# Patient Record
Sex: FEMALE | Race: WHITE | NOT HISPANIC OR LATINO | URBAN - METROPOLITAN AREA
[De-identification: names, ages, dates, MRNs, and addresses within clinical notes are randomized per-mention and may not be internally consistent; named-entity substitution may affect disease eponyms.]

---

## 2019-12-28 ENCOUNTER — EMERGENCY (EMERGENCY)
Facility: HOSPITAL | Age: 24
LOS: 0 days | Discharge: HOME | End: 2019-12-28
Attending: EMERGENCY MEDICINE
Payer: COMMERCIAL

## 2019-12-28 VITALS — OXYGEN SATURATION: 97 % | DIASTOLIC BLOOD PRESSURE: 84 MMHG | HEART RATE: 102 BPM | SYSTOLIC BLOOD PRESSURE: 134 MMHG

## 2019-12-28 VITALS — WEIGHT: 104.06 LBS

## 2019-12-28 DIAGNOSIS — R06.2 WHEEZING: ICD-10-CM

## 2019-12-28 DIAGNOSIS — R06.02 SHORTNESS OF BREATH: ICD-10-CM

## 2019-12-28 DIAGNOSIS — J45.901 UNSPECIFIED ASTHMA WITH (ACUTE) EXACERBATION: ICD-10-CM

## 2019-12-28 DIAGNOSIS — J06.9 ACUTE UPPER RESPIRATORY INFECTION, UNSPECIFIED: ICD-10-CM

## 2019-12-28 LAB
FLU A RESULT: SIGNIFICANT CHANGE UP
FLU A RESULT: SIGNIFICANT CHANGE UP
FLUAV AG NPH QL: SIGNIFICANT CHANGE UP
FLUBV AG NPH QL: SIGNIFICANT CHANGE UP
RSV RESULT: SIGNIFICANT CHANGE UP
RSV RNA RESP QL NAA+PROBE: SIGNIFICANT CHANGE UP

## 2019-12-28 PROCEDURE — 87631 RESP VIRUS 3-5 TARGETS: CPT

## 2019-12-28 PROCEDURE — 99284 EMERGENCY DEPT VISIT MOD MDM: CPT

## 2019-12-28 PROCEDURE — 71046 X-RAY EXAM CHEST 2 VIEWS: CPT | Mod: 26

## 2019-12-28 PROCEDURE — 71046 X-RAY EXAM CHEST 2 VIEWS: CPT

## 2019-12-28 PROCEDURE — 94640 AIRWAY INHALATION TREATMENT: CPT

## 2019-12-28 PROCEDURE — 99283 EMERGENCY DEPT VISIT LOW MDM: CPT | Mod: 25

## 2019-12-28 PROCEDURE — 81025 URINE PREGNANCY TEST: CPT

## 2019-12-28 RX ORDER — IPRATROPIUM/ALBUTEROL SULFATE 18-103MCG
3 AEROSOL WITH ADAPTER (GRAM) INHALATION ONCE
Refills: 0 | Status: COMPLETED | OUTPATIENT
Start: 2019-12-28 | End: 2019-12-28

## 2019-12-28 RX ADMIN — Medication 50 MILLIGRAM(S): at 04:33

## 2019-12-28 RX ADMIN — Medication 3 MILLILITER(S): at 04:41

## 2019-12-28 NOTE — ED PROVIDER NOTE - OBJECTIVE STATEMENT
23 y/o F with h/o asthma and recent diagnosis of bronchitis at urgent care p/w wheezing and SOB PTA.  Pt notes a dry cough for the past week and was initially seen in urgent care on 12/22/19.  They gave her Azithromycin and prescribed Prednisone but she didn't fill the steroid Rx.   She was then seen again and prescribed Amoxicillin by urgent care yesterday.  She started taking Prednisone 40 mg yesterday.  She has been treated for pneumonia in the past.  Pt did 2 nebulizer tx at home PTA.

## 2019-12-28 NOTE — ED ADULT NURSE NOTE - OBJECTIVE STATEMENT
Pt presents to er with complaint of sob and can't get rid of her cough, pt states she has history of asthma and bronchitis and asked urgent care to switch her from Z-pack to a stronger abx, respirations unlabored at rest and with exertion, -intercostal retractions, 99% on ra, safety maintained, will continue to monitor.

## 2019-12-28 NOTE — ED PROVIDER NOTE - CLINICAL SUMMARY MEDICAL DECISION MAKING FREE TEXT BOX
Pt with h/o asthma p/w mild exacerbation without hypoxia or distress.  Plan: duoneb, prednisone, flu swab and CXR to r/o pneumonia

## 2019-12-28 NOTE — ED PROVIDER NOTE - PATIENT PORTAL LINK FT
You can access the FollowMyHealth Patient Portal offered by [facility] by registering at the following website: [email or web address]. By joining [employer]’s FollowMyHealth portal, you will also be able to view your health information using other applications (apps) compatible with our system.

## 2019-12-28 NOTE — ED ADULT NURSE NOTE - NSIMPLEMENTINTERV_GEN_ALL_ED
Implemented All Universal Safety Interventions:  [place] to call system. Call bell, personal items and telephone within reach. Instruct patient to call for assistance. Room bathroom lighting operational. Non-slip footwear when patient is off stretcher. Physically safe environment: no spills, clutter or unnecessary equipment. Stretcher in lowest position, wheels locked, appropriate side rails in place.

## 2019-12-28 NOTE — ED ADULT TRIAGE NOTE - CHIEF COMPLAINT QUOTE
Pt presents to ER c/o SOB/cough. Pt reports hx of asthma. Pt diagnosed with bronchitis 6 days ago. Pt woke up one hour PTA with difficulty breathing. Pt administered two nebulizer treatments when she woke up this morning

## 2019-12-28 NOTE — ED PROVIDER NOTE - CARE PLAN
Principal Discharge DX:	Viral URI with cough  Secondary Diagnosis:	Asthma, unspecified asthma severity, unspecified whether complicated, unspecified whether persistent

## 2023-08-07 NOTE — ED ADULT NURSE NOTE - CAS DISCH TRANSFER METHOD
[facility]   DISCHARGE INSTRUCTIONS    In Summary of our visit:  Today we discussed your memory concerns and neuropathy.    -labs today please    -Senior resource nurse to call with activities    -We need to work on mood, sleep (and pain issues). I will reach out to Dr. Ryan     -Should be taking 75 mg sertraline for your mood per , refill sent. Please discuss with her at your next appointment       Thank you for coming in today. It was a pleasure to see you. We want to give the best care possible. If you are notified to take a patient experience survey, please take the time to answer the questions. Your feedback is important to us and helps us know how we are doing. Our team appreciates it. Thank you!      IF YOU NEED ASSISTANCE IN BETWEEN NOW AND YOUR NEXT APPOINTMENT:  -Please call my office at [phone]  -My clinic is open Monday-Thursday 8-5 pm and I am off on Fridays. Urgent needs can be addressed by our [facility] partners when I am out of of the office.   -Please do not hesitate to call our office with questions or concerns. You may speak to one of our medical assistants or nurses initially, and they will ensure I receive the information.   -I recommend utilizing your [facility] adams for communication as well. We can send messages back and forth.   -Please send prescription refill requests in at least 3 days in advance as to not risk disruption in treatment.     
Private car
